# Patient Record
Sex: FEMALE | Race: BLACK OR AFRICAN AMERICAN | NOT HISPANIC OR LATINO | Employment: UNEMPLOYED | URBAN - METROPOLITAN AREA
[De-identification: names, ages, dates, MRNs, and addresses within clinical notes are randomized per-mention and may not be internally consistent; named-entity substitution may affect disease eponyms.]

---

## 2022-12-15 ENCOUNTER — HOSPITAL ENCOUNTER (OUTPATIENT)
Dept: DATA CONVERSION | Facility: HOSPITAL | Age: 22
End: 2022-12-15
Attending: OBSTETRICS & GYNECOLOGY

## 2022-12-15 DIAGNOSIS — R22.43 LOCALIZED SWELLING, MASS AND LUMP, LOWER LIMB, BILATERAL: ICD-10-CM

## 2022-12-15 DIAGNOSIS — Z97.5 PRESENCE OF (INTRAUTERINE) CONTRACEPTIVE DEVICE: ICD-10-CM

## 2022-12-15 DIAGNOSIS — Z87.891 PERSONAL HISTORY OF NICOTINE DEPENDENCE: ICD-10-CM

## 2022-12-15 DIAGNOSIS — O99.891 OTHER SPECIFIED DISEASES AND CONDITIONS COMPLICATING PREGNANCY (HHS-HCC): ICD-10-CM

## 2022-12-15 DIAGNOSIS — M79.89 OTHER SPECIFIED SOFT TISSUE DISORDERS: ICD-10-CM

## 2022-12-15 DIAGNOSIS — Z3A.37 37 WEEKS GESTATION OF PREGNANCY (HHS-HCC): ICD-10-CM

## 2022-12-15 DIAGNOSIS — N89.8 OTHER SPECIFIED NONINFLAMMATORY DISORDERS OF VAGINA: ICD-10-CM

## 2023-03-01 NOTE — PROGRESS NOTES
"    Current Stage:   Stage: Triage     Subjective Data:   Antepartum:  Antepartum:    21YO  PPD6 who presents for bilateral LE swelling/difficulty ambulating as well as pain at nexplanon site. Patient states that she has had some swelling in her legs, particularly  the R foot and ankle since leaving the hospital 2 days ago. She states that she no longer has any leg pain, just the swelling, and since they're unequal, she's worried that something is happening. She does endorse some vaginal discharge as well as suprapubic  pain, which she feels is normal for her post-partum. She denies any fevers, shortness of breath, chest pain, vomiting, diarrhea. She denies any changes in urine output, dysuria, or urinary retention. She does endorse a mild headache, which feels like  her migraines. additionally she is concerned that the nexplanon in her L arm is \"turning outward\" and she's worried that it's moving when it shouldn't.    ObHx:  : 2022    27.6wga    38wga  GynHx: remote hx of chlamydia and gonorrhea in 2017  PMHx: see above  SurgHx: tonsils  Meds: none  All: NKDA  FHx: paternal and maternal HTN  SocHx: no current t/e/i use        Objective Information:    Objective Information:      T   P  R  BP   MAP  SpO2   Value  36  75  18  138/64   92  95%  Date/Time 12/15 19:06 12/15 19:07 12/15 19:06 12/15 19:07  12/15 19:07 12/15 19:06  Range  (36C - 36C )  (75 - 81 )  (18 - 18 )  (138 - 138 )/ (64 - 64 )  (92 - 92 )  (95% - 95% )      Physical Exam:   Constitutional: alert, oriented, in no acute distress.   Obstetric: deferred   Eyes: wears glasses. pupils equal, sclerae clear   Respiratory/Thorax: normal respiratory effort, lungs  clear, no wheezes or rhonchi   Cardiovascular: normal S1 S2 RRR, no murmurs   Gastrointestinal: soft, nontender, nondistended without  guarding or rebound tenderness.   Musculoskeletal: normal gait   Extremities: 1+ edema of bilateral lower extremities  with mild " increase in R dorsum and ankle swelling. No calf or thigh tenderness, erythema or induration. 5/5 strength in dorsiflexion, plantar flexion, knee flexion bilaterally.   Neurological: no obvious facial droop, slurred speech,  moves all four extremities spontaneously. No evidence of focal deficit   Psychological: appropriate mood and affect   Skin: subcentimeter scab vs dermatofibroma on posteromedial  R LE.     Assessment and Plan:   Assessment:    23YO  PPD6 who presents for bilateral LE swelling.    #leg swelling  -low concern for DVT based on benign exam findings  -follow-up L LE Doppler    #nexplanon  -correct position by palpation, reassured    Dispo: signed out to night team.    Discussed with Dr. Rahul Adan MD  Emergency Medicine PGY-1      ADDENDUM: Doppler wet read neg for DVT. Stable for DC home with routine PP care.    French Camejo MD (Sally), PGY-4  Vocera      Attestation:   Note Completion:  I am a:  Resident/Fellow   Attending Attestation I saw and evaluated the patient.  I personally obtained the key and critical portions of the history and physical exam or was physically present for key and  critical portions performed by the resident/fellow. I reviewed the resident/fellow?s documentation and discussed the patient with the resident/fellow.  I agree with the resident/fellow?s medical decision making as documented in the note.     I personally evaluated the patient on 15-Dec-2022         Electronic Signatures:  Noe Adan (Resident))  (Signed 15-Dec-2022 19:54)   Authored: Current Stage, Subjective Data, Objective Data,  Assessment and Plan  Beatris Kay)  (Signed 16-Dec-2022 05:54)   Authored: Note Completion   Co-Signer: Assessment and Plan, Note Completion  French Camejo (Resident))  (Signed 15-Dec-2022 22:41)   Authored: Assessment and Plan, Note Completion      Last Updated: 16-Dec-2022 05:54 by Beatris Kay)

## 2023-09-08 VITALS — HEIGHT: 62 IN | BODY MASS INDEX: 53.92 KG/M2 | WEIGHT: 293 LBS

## 2023-09-14 NOTE — PROGRESS NOTES
"    Current Stage:   Stage: Triage     Subjective Data:   Antepartum:  Antepartum:    23YO  PPD6 who presents for bilateral LE swelling/difficulty ambulating as well as pain at nexplanon site. Patient states that she has had some swelling in her legs, particularly  the R foot and ankle since leaving the hospital 2 days ago. She states that she no longer has any leg pain, just the swelling, and since they're unequal, she's worried that something is happening. She does endorse some vaginal discharge as well as suprapubic  pain, which she feels is normal for her post-partum. She denies any fevers, shortness of breath, chest pain, vomiting, diarrhea. She denies any changes in urine output, dysuria, or urinary retention. She does endorse a mild headache, which feels like  her migraines. additionally she is concerned that the nexplanon in her L arm is \"turning outward\" and she's worried that it's moving when it shouldn't.    ObHx:  : 2022    27.6wga    38wga  GynHx: remote hx of chlamydia and gonorrhea in 2017  PMHx: see above  SurgHx: tonsils  Meds: none  All: NKDA  FHx: paternal and maternal HTN  SocHx: no current t/e/i use        Objective Information:    Objective Information:      T   P  R  BP   MAP  SpO2   Value  36  75  18  138/64   92  95%  Date/Time 12/15 19:06 12/15 19:07 12/15 19:06 12/15 19:07  12/15 19:07 12/15 19:06  Range  (36C - 36C )  (75 - 81 )  (18 - 18 )  (138 - 138 )/ (64 - 64 )  (92 - 92 )  (95% - 95% )      Physical Exam:   Constitutional: alert, oriented, in no acute distress.   Obstetric: deferred   Eyes: wears glasses. pupils equal, sclerae clear   Respiratory/Thorax: normal respiratory effort, lungs  clear, no wheezes or rhonchi   Cardiovascular: normal S1 S2 RRR, no murmurs   Gastrointestinal: soft, nontender, nondistended without  guarding or rebound tenderness.   Musculoskeletal: normal gait   Extremities: 1+ edema of bilateral lower extremities  with mild " increase in R dorsum and ankle swelling. No calf or thigh tenderness, erythema or induration. 5/5 strength in dorsiflexion, plantar flexion, knee flexion bilaterally.   Neurological: no obvious facial droop, slurred speech,  moves all four extremities spontaneously. No evidence of focal deficit   Psychological: appropriate mood and affect   Skin: subcentimeter scab vs dermatofibroma on posteromedial  R LE.     Assessment and Plan:   Assessment:    21YO  PPD6 who presents for bilateral LE swelling.    #leg swelling  -low concern for DVT based on benign exam findings  -follow-up L LE Doppler    #nexplanon  -correct position by palpation, reassured    Dispo: signed out to night team.    Discussed with Dr. Rahul Adan MD  Emergency Medicine PGY-1      ADDENDUM: Doppler wet read neg for DVT. Stable for DC home with routine PP care.    French Camejo MD (Sally), PGY-4  Vocera      Attestation:   Note Completion:  I am a:  Resident/Fellow   Attending Attestation I saw and evaluated the patient.  I personally obtained the key and critical portions of the history and physical exam or was physically present for key and  critical portions performed by the resident/fellow. I reviewed the resident/fellow?s documentation and discussed the patient with the resident/fellow.  I agree with the resident/fellow?s medical decision making as documented in the note.     I personally evaluated the patient on 15-Dec-2022         Electronic Signatures:  Noe Adan (Resident))  (Signed 15-Dec-2022 19:54)   Authored: Current Stage, Subjective Data, Objective Data,  Assessment and Plan  Beatris Kay)  (Signed 16-Dec-2022 05:54)   Authored: Note Completion   Co-Signer: Assessment and Plan, Note Completion  French Camejo (Resident))  (Signed 15-Dec-2022 22:41)   Authored: Assessment and Plan, Note Completion      Last Updated: 16-Dec-2022 05:54 by Beatris Kay)

## 2024-06-20 ENCOUNTER — APPOINTMENT (OUTPATIENT)
Dept: PRIMARY CARE | Facility: CLINIC | Age: 24
End: 2024-06-20
Payer: MEDICAID

## 2024-06-26 ENCOUNTER — HOSPITAL ENCOUNTER (EMERGENCY)
Facility: HOSPITAL | Age: 24
Discharge: HOME | End: 2024-06-26
Attending: EMERGENCY MEDICINE
Payer: MEDICAID

## 2024-06-26 VITALS
BODY MASS INDEX: 52.08 KG/M2 | HEIGHT: 62 IN | SYSTOLIC BLOOD PRESSURE: 139 MMHG | RESPIRATION RATE: 18 BRPM | TEMPERATURE: 97.4 F | HEART RATE: 100 BPM | OXYGEN SATURATION: 98 % | DIASTOLIC BLOOD PRESSURE: 78 MMHG | WEIGHT: 283 LBS

## 2024-06-26 DIAGNOSIS — R42 DIZZINESS: Primary | ICD-10-CM

## 2024-06-26 LAB
ALBUMIN SERPL BCP-MCNC: 3.6 G/DL (ref 3.4–5)
ALP SERPL-CCNC: 89 U/L (ref 33–110)
ALT SERPL W P-5'-P-CCNC: 15 U/L (ref 7–45)
AMPHETAMINES UR QL SCN: NORMAL
ANION GAP SERPL CALC-SCNC: 12 MMOL/L (ref 10–20)
APPEARANCE UR: CLEAR
AST SERPL W P-5'-P-CCNC: 15 U/L (ref 9–39)
BARBITURATES UR QL SCN: NORMAL
BASOPHILS # BLD AUTO: 0.02 X10*3/UL (ref 0–0.1)
BASOPHILS NFR BLD AUTO: 0.2 %
BENZODIAZ UR QL SCN: NORMAL
BILIRUB SERPL-MCNC: 0.6 MG/DL (ref 0–1.2)
BILIRUB UR STRIP.AUTO-MCNC: NEGATIVE MG/DL
BUN SERPL-MCNC: 13 MG/DL (ref 6–23)
BZE UR QL SCN: NORMAL
CALCIUM SERPL-MCNC: 9.5 MG/DL (ref 8.6–10.3)
CANNABINOIDS UR QL SCN: NORMAL
CHLORIDE SERPL-SCNC: 98 MMOL/L (ref 98–107)
CO2 SERPL-SCNC: 27 MMOL/L (ref 21–32)
COLOR UR: NORMAL
CREAT SERPL-MCNC: 0.66 MG/DL (ref 0.5–1.05)
EGFRCR SERPLBLD CKD-EPI 2021: >90 ML/MIN/1.73M*2
EOSINOPHIL # BLD AUTO: 0.38 X10*3/UL (ref 0–0.7)
EOSINOPHIL NFR BLD AUTO: 3 %
ERYTHROCYTE [DISTWIDTH] IN BLOOD BY AUTOMATED COUNT: 17.3 % (ref 11.5–14.5)
FENTANYL+NORFENTANYL UR QL SCN: NORMAL
GLUCOSE BLD MANUAL STRIP-MCNC: 89 MG/DL (ref 74–99)
GLUCOSE SERPL-MCNC: 95 MG/DL (ref 74–99)
GLUCOSE UR STRIP.AUTO-MCNC: NORMAL MG/DL
HCG UR QL IA.RAPID: NEGATIVE
HCT VFR BLD AUTO: 38.8 % (ref 36–46)
HGB BLD-MCNC: 11.3 G/DL (ref 12–16)
HOLD SPECIMEN: NORMAL
IMM GRANULOCYTES # BLD AUTO: 0.04 X10*3/UL (ref 0–0.7)
IMM GRANULOCYTES NFR BLD AUTO: 0.3 % (ref 0–0.9)
KETONES UR STRIP.AUTO-MCNC: NEGATIVE MG/DL
LEUKOCYTE ESTERASE UR QL STRIP.AUTO: NEGATIVE
LIPASE SERPL-CCNC: 13 U/L (ref 9–82)
LYMPHOCYTES # BLD AUTO: 3.62 X10*3/UL (ref 1.2–4.8)
LYMPHOCYTES NFR BLD AUTO: 28.2 %
MAGNESIUM SERPL-MCNC: 1.9 MG/DL (ref 1.6–2.4)
MCH RBC QN AUTO: 19.8 PG (ref 26–34)
MCHC RBC AUTO-ENTMCNC: 29.1 G/DL (ref 32–36)
MCV RBC AUTO: 68 FL (ref 80–100)
METHADONE UR QL SCN: NORMAL
MONOCYTES # BLD AUTO: 0.73 X10*3/UL (ref 0.1–1)
MONOCYTES NFR BLD AUTO: 5.7 %
NEUTROPHILS # BLD AUTO: 8.05 X10*3/UL (ref 1.2–7.7)
NEUTROPHILS NFR BLD AUTO: 62.6 %
NITRITE UR QL STRIP.AUTO: NEGATIVE
NRBC BLD-RTO: 0 /100 WBCS (ref 0–0)
OPIATES UR QL SCN: NORMAL
OXYCODONE+OXYMORPHONE UR QL SCN: NORMAL
PCP UR QL SCN: NORMAL
PH UR STRIP.AUTO: 5.5 [PH]
PLATELET # BLD AUTO: 405 X10*3/UL (ref 150–450)
POTASSIUM SERPL-SCNC: 3.4 MMOL/L (ref 3.5–5.3)
PROT SERPL-MCNC: 7.7 G/DL (ref 6.4–8.2)
PROT UR STRIP.AUTO-MCNC: NEGATIVE MG/DL
RBC # BLD AUTO: 5.7 X10*6/UL (ref 4–5.2)
RBC # UR STRIP.AUTO: NEGATIVE /UL
SODIUM SERPL-SCNC: 134 MMOL/L (ref 136–145)
SP GR UR STRIP.AUTO: 1.01
UROBILINOGEN UR STRIP.AUTO-MCNC: NORMAL MG/DL
WBC # BLD AUTO: 12.8 X10*3/UL (ref 4.4–11.3)

## 2024-06-26 PROCEDURE — 99283 EMERGENCY DEPT VISIT LOW MDM: CPT | Performed by: EMERGENCY MEDICINE

## 2024-06-26 PROCEDURE — 85025 COMPLETE CBC W/AUTO DIFF WBC: CPT | Performed by: SURGERY

## 2024-06-26 PROCEDURE — 83735 ASSAY OF MAGNESIUM: CPT | Performed by: SURGERY

## 2024-06-26 PROCEDURE — 82947 ASSAY GLUCOSE BLOOD QUANT: CPT

## 2024-06-26 PROCEDURE — 81025 URINE PREGNANCY TEST: CPT | Performed by: SURGERY

## 2024-06-26 PROCEDURE — 81003 URINALYSIS AUTO W/O SCOPE: CPT | Performed by: SURGERY

## 2024-06-26 PROCEDURE — 80307 DRUG TEST PRSMV CHEM ANLYZR: CPT | Performed by: SURGERY

## 2024-06-26 PROCEDURE — 83690 ASSAY OF LIPASE: CPT | Performed by: SURGERY

## 2024-06-26 PROCEDURE — 80053 COMPREHEN METABOLIC PANEL: CPT | Performed by: SURGERY

## 2024-06-26 PROCEDURE — 36415 COLL VENOUS BLD VENIPUNCTURE: CPT | Performed by: SURGERY

## 2024-06-26 RX ORDER — ONDANSETRON HYDROCHLORIDE 2 MG/ML
4 INJECTION, SOLUTION INTRAVENOUS ONCE
Status: DISCONTINUED | OUTPATIENT
Start: 2024-06-26 | End: 2024-06-26 | Stop reason: HOSPADM

## 2024-06-26 ASSESSMENT — COLUMBIA-SUICIDE SEVERITY RATING SCALE - C-SSRS
1. IN THE PAST MONTH, HAVE YOU WISHED YOU WERE DEAD OR WISHED YOU COULD GO TO SLEEP AND NOT WAKE UP?: NO
6. HAVE YOU EVER DONE ANYTHING, STARTED TO DO ANYTHING, OR PREPARED TO DO ANYTHING TO END YOUR LIFE?: NO
2. HAVE YOU ACTUALLY HAD ANY THOUGHTS OF KILLING YOURSELF?: NO

## 2024-06-26 NOTE — ED TRIAGE NOTES
Patient arrived to ed with complaint of dizziness and nausea. The patient stated it had been going on for 40 minutes. No prior medical history. Gcs 15.

## 2024-06-26 NOTE — Clinical Note
Surendra Street was seen and treated in our emergency department on 6/26/2024.  She may return to work on 06/27/2024.       If you have any questions or concerns, please don't hesitate to call.      Sandie Linares, DO

## 2024-06-26 NOTE — ED PROVIDER NOTES
Chief Complaint   Patient presents with    Dizziness     HPI:   Surendra Street is an 24 y.o. obese female presenting to the ED for chief complaint of dizziness and nausea that started about 40 minutes prior to arrival.  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  She explains that she feels lightheaded that is worse when she changes positions.  She explains she does not have any symptoms when she is staying in 1 position.  She endorses associated nausea with no vomiting.  Denies fever chills or sweats.  Denies abdominal pain.  Her last bowel movement was yesterday and was normal.  Denies chest pain or shortness of breath.    Medications: Nexplanon  Soc HX: Denies substances  No Known Allergies:  Past Medical History:   Diagnosis Date    Anxiety disorder, unspecified     Anxiety and depression    Candidiasis of skin and nail 04/15/2015    Candidal dermatitis    Encounter for immunization 2015    Immunization due    Encounter for routine checking of intrauterine contraceptive device 2019    Intrauterine device surveillance    Encounter for screening for disorder due to exposure to contaminants     Screening for lead exposure    Encounter for supervision of normal first pregnancy, unspecified trimester ([facility])     Pregnancy, first    Personal history of other (healed) physical injury and trauma     History of sprain of ankle    Personal history of other infectious and parasitic diseases 2017    History of gonorrhea    Personal history of pre-term labor     History of  delivery     Past Surgical History:   Procedure Laterality Date    OTHER SURGICAL HISTORY  2018    Tonsillectomy with adenoidectomy    OTHER SURGICAL HISTORY  2018    Tooth extraction     No family history on file.     Physical Exam  Vitals and nursing note reviewed.   Constitutional:       General: She is not in acute distress.     Appearance: She is  well-developed. She is not ill-appearing.   HENT:      Head: Normocephalic and atraumatic.      Right Ear: Tympanic membrane normal.      Left Ear: Tympanic membrane normal.      Nose: Nose normal.      Mouth/Throat:      Mouth: Mucous membranes are moist.      Pharynx: Oropharynx is clear.   Eyes:      Extraocular Movements: Extraocular movements intact.      Conjunctiva/sclera: Conjunctivae normal.      Pupils: Pupils are equal, round, and reactive to light.   Cardiovascular:      Rate and Rhythm: Normal rate and regular rhythm.      Heart sounds: Normal heart sounds. No murmur heard.  Pulmonary:      Effort: Pulmonary effort is normal. No respiratory distress.      Breath sounds: Normal breath sounds.   Abdominal:      Palpations: Abdomen is soft.      Tenderness: There is no abdominal tenderness. There is no guarding or rebound.   Musculoskeletal:         General: No swelling.      Cervical back: Neck supple.   Skin:     General: Skin is warm and dry.      Capillary Refill: Capillary refill takes less than 2 seconds.   Neurological:      General: No focal deficit present.      Mental Status: She is alert and oriented to person, place, and time.   Psychiatric:         Mood and Affect: Mood normal.        VS: As documented in the triage note and EMR flowsheet from this visit were reviewed.    Medical Decision Making: This is a 24-year-old female presenting to the ED with chief complaint of dizziness and nausea that started while she was working about 40 minutes prior to arrival.  Initial exam patient's vitals are stable she is speaking in full sentences slowly but articulately in no acute distress.  PERRLA, EOMI. TMs are clear bilaterally.  Posterior pharynx is clear.  Heart rate and rhythm is regular.  Lungs clear to auscultation bilaterally.  Abdomen was soft without peritoneal signs.  Basic lab work was ordered as well as urinalysis and drug screen.  Patient was signed out to incoming doctor Dr. Linares pending  lab work results and orthostatic vitals.  Considered CT imaging of the head however patient did not have a headache and her dizziness was positional.     This patient was cared for in the setting of nationwide stress on resources and staffing.    This report was transcribed using voice recognition software.  Every effort was made to ensure accuracy, however, inadvertently computerized transcription errors may be present.         Daryn Ross PA-C  06/26/24 0603

## 2024-06-27 ENCOUNTER — APPOINTMENT (OUTPATIENT)
Dept: OBSTETRICS AND GYNECOLOGY | Facility: CLINIC | Age: 24
End: 2024-06-27
Payer: MEDICAID

## 2024-12-12 ENCOUNTER — HOSPITAL ENCOUNTER (EMERGENCY)
Facility: HOSPITAL | Age: 24
Discharge: HOME | End: 2024-12-12
Payer: MEDICAID

## 2024-12-12 VITALS
OXYGEN SATURATION: 100 % | RESPIRATION RATE: 18 BRPM | TEMPERATURE: 97.3 F | WEIGHT: 283 LBS | HEIGHT: 62 IN | SYSTOLIC BLOOD PRESSURE: 133 MMHG | HEART RATE: 71 BPM | BODY MASS INDEX: 52.08 KG/M2 | DIASTOLIC BLOOD PRESSURE: 65 MMHG

## 2024-12-12 DIAGNOSIS — Z11.3 SCREEN FOR STD (SEXUALLY TRANSMITTED DISEASE): Primary | ICD-10-CM

## 2024-12-12 LAB
APPEARANCE UR: ABNORMAL
BILIRUB UR STRIP.AUTO-MCNC: NEGATIVE MG/DL
C TRACH RRNA SPEC QL NAA+PROBE: NEGATIVE
CLUE CELLS SPEC QL WET PREP: NORMAL
COLOR UR: YELLOW
GLUCOSE UR STRIP.AUTO-MCNC: NORMAL MG/DL
HCV AB SER QL: NONREACTIVE
HIV 1+2 AB+HIV1 P24 AG SERPL QL IA: NONREACTIVE
HOLD SPECIMEN: NORMAL
KETONES UR STRIP.AUTO-MCNC: ABNORMAL MG/DL
LEUKOCYTE ESTERASE UR QL STRIP.AUTO: ABNORMAL
MUCOUS THREADS #/AREA URNS AUTO: ABNORMAL /LPF
N GONORRHOEA DNA SPEC QL PROBE+SIG AMP: NEGATIVE
NITRITE UR QL STRIP.AUTO: NEGATIVE
PH UR STRIP.AUTO: 7 [PH]
PREGNANCY TEST URINE, POC: NEGATIVE
PROT UR STRIP.AUTO-MCNC: NEGATIVE MG/DL
RBC # UR STRIP.AUTO: NEGATIVE /UL
RBC #/AREA URNS AUTO: ABNORMAL /HPF
SP GR UR STRIP.AUTO: 1.02
SQUAMOUS #/AREA URNS AUTO: ABNORMAL /HPF
T VAGINALIS RRNA SPEC QL NAA+PROBE: NEGATIVE
T VAGINALIS SPEC QL WET PREP: NORMAL
TREPONEMA PALLIDUM IGG+IGM AB [PRESENCE] IN SERUM OR PLASMA BY IMMUNOASSAY: NONREACTIVE
TRICHOMONAS REFLEX COMMENT: NORMAL
UROBILINOGEN UR STRIP.AUTO-MCNC: ABNORMAL MG/DL
WBC #/AREA URNS AUTO: ABNORMAL /HPF
WBC VAG QL WET PREP: NORMAL
YEAST VAG QL WET PREP: NORMAL

## 2024-12-12 PROCEDURE — 36415 COLL VENOUS BLD VENIPUNCTURE: CPT | Performed by: PHYSICIAN ASSISTANT

## 2024-12-12 PROCEDURE — 87661 TRICHOMONAS VAGINALIS AMPLIF: CPT | Performed by: PHYSICIAN ASSISTANT

## 2024-12-12 PROCEDURE — 87491 CHLMYD TRACH DNA AMP PROBE: CPT | Performed by: PHYSICIAN ASSISTANT

## 2024-12-12 PROCEDURE — 99284 EMERGENCY DEPT VISIT MOD MDM: CPT

## 2024-12-12 PROCEDURE — 81025 URINE PREGNANCY TEST: CPT | Performed by: PHYSICIAN ASSISTANT

## 2024-12-12 PROCEDURE — 86803 HEPATITIS C AB TEST: CPT | Performed by: PHYSICIAN ASSISTANT

## 2024-12-12 PROCEDURE — 99284 EMERGENCY DEPT VISIT MOD MDM: CPT | Performed by: PHYSICIAN ASSISTANT

## 2024-12-12 PROCEDURE — 87591 N.GONORRHOEAE DNA AMP PROB: CPT | Performed by: PHYSICIAN ASSISTANT

## 2024-12-12 PROCEDURE — 86780 TREPONEMA PALLIDUM: CPT | Performed by: PHYSICIAN ASSISTANT

## 2024-12-12 PROCEDURE — 87086 URINE CULTURE/COLONY COUNT: CPT | Performed by: PHYSICIAN ASSISTANT

## 2024-12-12 PROCEDURE — 87210 SMEAR WET MOUNT SALINE/INK: CPT | Mod: 59 | Performed by: PHYSICIAN ASSISTANT

## 2024-12-12 PROCEDURE — 87389 HIV-1 AG W/HIV-1&-2 AB AG IA: CPT | Performed by: PHYSICIAN ASSISTANT

## 2024-12-12 PROCEDURE — 81001 URINALYSIS AUTO W/SCOPE: CPT | Performed by: PHYSICIAN ASSISTANT

## 2024-12-12 PROCEDURE — 99283 EMERGENCY DEPT VISIT LOW MDM: CPT

## 2024-12-12 RX ORDER — VALACYCLOVIR HYDROCHLORIDE 1 G/1
1000 TABLET, FILM COATED ORAL DAILY
Qty: 5 TABLET | Refills: 0 | Status: SHIPPED | OUTPATIENT
Start: 2024-12-12 | End: 2024-12-17

## 2024-12-12 ASSESSMENT — PAIN DESCRIPTION - LOCATION: LOCATION: VAGINA

## 2024-12-12 ASSESSMENT — COLUMBIA-SUICIDE SEVERITY RATING SCALE - C-SSRS
6. HAVE YOU EVER DONE ANYTHING, STARTED TO DO ANYTHING, OR PREPARED TO DO ANYTHING TO END YOUR LIFE?: NO
2. HAVE YOU ACTUALLY HAD ANY THOUGHTS OF KILLING YOURSELF?: NO
1. IN THE PAST MONTH, HAVE YOU WISHED YOU WERE DEAD OR WISHED YOU COULD GO TO SLEEP AND NOT WAKE UP?: NO

## 2024-12-12 ASSESSMENT — PAIN DESCRIPTION - PAIN TYPE: TYPE: ACUTE PAIN

## 2024-12-12 ASSESSMENT — PAIN SCALES - GENERAL: PAINLEVEL_OUTOF10: 9

## 2024-12-12 ASSESSMENT — PAIN DESCRIPTION - DESCRIPTORS: DESCRIPTORS: BURNING

## 2024-12-12 ASSESSMENT — PAIN - FUNCTIONAL ASSESSMENT: PAIN_FUNCTIONAL_ASSESSMENT: 0-10

## 2024-12-12 NOTE — Clinical Note
Surendra Street was seen and treated in our emergency department on 12/12/2024.  She may return to work on 12/13/2024.       If you have any questions or concerns, please don't hesitate to call.      Cecilia Franco PA-C

## 2024-12-12 NOTE — ED PROVIDER NOTES
"This is a 24-year-old female with past medical history of genital herpes who presents to the ED with vaginal discharge, discomfort, and odor for the past 2 to 3 days that has been worsening.  She also endorses some discomfort around her labia with concern for a HSV outbreak.  She denies seeing any sores.  She states that she has recently been sexually active with 2 partners and does not use protection.  She would like to be screened for STDs.  She denies concern for pregnancy and has a Nexplanon device.      History provided by:  Patient   used: No             Visit Vitals  /65   Pulse 71   Temp 36.3 °C (97.3 °F) (Temporal)   Resp 18   Ht 1.575 m (5' 2\")   Wt 128 kg (283 lb)   SpO2 100%   BMI 51.76 kg/m²   BSA 2.37 m²          Physical Exam     Physical exam:    General: Vitals noted, no distress. Afebrile.    EENT: PERRL, EOM's intact. Eye lids without lesions. No scleral icterus. Normal phonation. Nares patent. MMM.     Cardiac: Regular, rate, rhythm, no murmur.    Pulmonary: Lungs clear bilaterally with good aeration. No adventitious breath sounds.    Abdomen: Nontender, soft, nonsurgical. No peritoneal signs. Normoactive bowel sounds. No CVA tenderness.     Pelvic Exam: Chaperone present. External genitalia normal. No rashes or lesions. Speculum exam shows no lesions on the cervix.  Moderate amount of white/gray discharge within the vagina. +odor. No bleeding. Closed cervical os. Bimanual exam shows no adnexal pain or mass. No cervical motion tenderness.    Extremities: No peripheral edema. Full range of motion. Moves all extremities freely.     Skin: No rash. Warm and dry. No discoloration noted.     Neuro: No focal neurologic deficits noted.  Pt is A&O x3, speech is clear, moves all extremities independently sensation is intact.            Labs Reviewed   POCT PREGNANCY, URINE - Normal       Result Value    Preg Test, Ur Negative     TRICHOMONAS WET PREP REFLEX TO PCR   C. TRACHOMATIS " + N. GONORRHOEAE, AMPLIFIED   URINALYSIS WITH REFLEX CULTURE AND MICROSCOPIC    Narrative:     The following orders were created for panel order Urinalysis with Reflex Culture and Microscopic.  Procedure                               Abnormality         Status                     ---------                               -----------         ------                     Urinalysis with Reflex C...[[id number]]                                                 Extra Urine Gray Tube[[id number]]                                                         Please view results for these tests on the individual orders.   SYPHILIS SCREENING WITH REFLEX   HIV 1/2 ANTIGEN/ANTIBODY SCREEN WIH REFLEX TO CONFIRMATION   HEPATITIS C ANTIBODY   URINALYSIS WITH REFLEX CULTURE AND MICROSCOPIC   EXTRA URINE GRAY TUBE       No orders to display           ED Course & MDM     Medical Decision Making  This is a 24-year-old female who presents to the ED with vaginal discharge and odor as well as concern for an impending HSV outbreak due to genital discomfort.  Vital stable upon arrival to the ED.  On examination abdomen soft and nontender.  No CVA tenderness.  Pelvic examination performed with chaperone present which showed a moderate amount of white discharge within the vagina.  There was an odor present.  No noticeable HSV lesions at this time.  No cervical motion tenderness.  No adnexal pain or masses palpated.  Wet prep as well as GC and chlamydia swabs obtained during pelvic examination.  Based on patient's pelvic examination low suspicion for PID/TOA at this time.  Urine pregnancy test obtained was negative.  UA ordered as well as HIV, hepatitis C, and syphilis screening.  Wet prep was negative.  I informed the patient of this result.  She did not want to wait for any other results at this point in time.  She was told that she would receive a phone call in 2-3 business days with any positive results on her STD screening.  She was written a  prescription for Valtrex due to concern for possible impending HSV outbreak.  She was given signs and symptoms that she should return to the ED with.  She was provided with OB/GYN follow-up information and was discharged from the ED in stable condition.    Amount and/or Complexity of Data Reviewed  Labs: ordered.    Risk  Prescription drug management.         Diagnoses as of 12/12/24 1222   Screen for STD (sexually transmitted disease)       Patient was seen independently    Procedures    ZARI Garcia, PA-C     Cecilia Franco PA-C  12/12/24 122

## 2024-12-13 LAB — BACTERIA UR CULT: NORMAL

## 2024-12-27 ENCOUNTER — OFFICE VISIT (OUTPATIENT)
Dept: OBSTETRICS AND GYNECOLOGY | Facility: CLINIC | Age: 24
End: 2024-12-27
Payer: MEDICAID

## 2024-12-27 VITALS
SYSTOLIC BLOOD PRESSURE: 134 MMHG | WEIGHT: 293 LBS | DIASTOLIC BLOOD PRESSURE: 83 MMHG | BODY MASS INDEX: 53.97 KG/M2 | HEART RATE: 97 BPM

## 2024-12-27 DIAGNOSIS — M62.89 PELVIC FLOOR DYSFUNCTION: ICD-10-CM

## 2024-12-27 DIAGNOSIS — R51.9 NONINTRACTABLE HEADACHE, UNSPECIFIED CHRONICITY PATTERN, UNSPECIFIED HEADACHE TYPE: ICD-10-CM

## 2024-12-27 DIAGNOSIS — Z11.3 SCREEN FOR STD (SEXUALLY TRANSMITTED DISEASE): Primary | ICD-10-CM

## 2024-12-27 DIAGNOSIS — N89.8 VAGINAL ODOR: ICD-10-CM

## 2024-12-27 PROCEDURE — 87205 SMEAR GRAM STAIN: CPT | Performed by: STUDENT IN AN ORGANIZED HEALTH CARE EDUCATION/TRAINING PROGRAM

## 2024-12-27 ASSESSMENT — PATIENT HEALTH QUESTIONNAIRE - PHQ9
1. LITTLE INTEREST OR PLEASURE IN DOING THINGS: MORE THAN HALF THE DAYS
10. IF YOU CHECKED OFF ANY PROBLEMS, HOW DIFFICULT HAVE THESE PROBLEMS MADE IT FOR YOU TO DO YOUR WORK, TAKE CARE OF THINGS AT HOME, OR GET ALONG WITH OTHER PEOPLE: SOMEWHAT DIFFICULT
SUM OF ALL RESPONSES TO PHQ9 QUESTIONS 1 AND 2: 2
2. FEELING DOWN, DEPRESSED OR HOPELESS: NOT AT ALL

## 2024-12-27 NOTE — PROGRESS NOTES
Surendra Street is a 24 y.o. female who is here for vaginal odor    Subjective   Vaginal odor since completing HSV txt. No abn discharge. Notes some vaginal pain with intercourse    HA for last few months. Needs to see optometrist. No other significant sx related to this    Objective   /83 (BP Location: Right arm, Patient Position: Sitting, BP Cuff Size: Adult)   Pulse 97   Wt 134 kg (295 lb 1.6 oz)   BMI 53.97 kg/m²     Physical Exam  Constitutional:       Appearance: Normal appearance.   HENT:      Head: Normocephalic and atraumatic.      Nose: Nose normal.      Mouth/Throat:      Mouth: Mucous membranes are moist.   Eyes:      Extraocular Movements: Extraocular movements intact.   Pulmonary:      Effort: Pulmonary effort is normal.   Abdominal:      General: Abdomen is flat.   Genitourinary:     Comments: Normal external genitalia, vagina, cervix. No significant discharge  Mild pelvic floor tenderness on bimanual exam  Musculoskeletal:         General: Normal range of motion.   Skin:     General: Skin is warm and dry.   Neurological:      General: No focal deficit present.      Mental Status: She is alert and oriented to person, place, and time.   Psychiatric:         Mood and Affect: Mood normal.         Behavior: Behavior normal.       Assessment/Plan   Surendra Street is a 24 y.o. female who is here for vaginal odor  Problem List Items Addressed This Visit       Vaginal odor    Current Assessment & Plan     Vaginitis swab sent will follow up results. No notable exam findings         Relevant Orders    Vaginitis Gram Stain For Bacterial Vaginosis + Yeast    Pelvic floor dysfunction    Current Assessment & Plan     PFPT referral         Relevant Orders    Referral to Physical Therapy    Nonintractable headache    Current Assessment & Plan     Referral to primary care. Rec follow up with optometrist. No red flag sx         Relevant Orders    Referral to Primary Care     Other Visit Diagnoses        Screen for STD (sexually transmitted disease)    -  Primary           FU 3 months for annual    D/w Dr. Curtis Roa MD  PGY-4, Obstetrics and Gynecology

## 2024-12-28 LAB
CLUE CELLS VAG LPF-#/AREA: NORMAL /[LPF]
NUGENT SCORE: 1
YEAST VAG WET PREP-#/AREA: NORMAL

## 2025-01-01 ENCOUNTER — HOSPITAL ENCOUNTER (EMERGENCY)
Facility: HOSPITAL | Age: 25
Discharge: HOME | End: 2025-01-01
Attending: INTERNAL MEDICINE
Payer: MEDICAID

## 2025-01-01 ENCOUNTER — APPOINTMENT (OUTPATIENT)
Dept: RADIOLOGY | Facility: HOSPITAL | Age: 25
End: 2025-01-01
Payer: MEDICAID

## 2025-01-01 ENCOUNTER — HOSPITAL ENCOUNTER (OUTPATIENT)
Facility: HOSPITAL | Age: 25
Discharge: STILL ADMITTED OR ADMINISTRATIVE | End: 2025-01-01
Attending: OBSTETRICS & GYNECOLOGY | Admitting: OBSTETRICS & GYNECOLOGY
Payer: MEDICAID

## 2025-01-01 VITALS
WEIGHT: 293 LBS | SYSTOLIC BLOOD PRESSURE: 132 MMHG | HEART RATE: 80 BPM | RESPIRATION RATE: 20 BRPM | HEIGHT: 62 IN | DIASTOLIC BLOOD PRESSURE: 82 MMHG | TEMPERATURE: 98.3 F | BODY MASS INDEX: 53.92 KG/M2 | OXYGEN SATURATION: 99 %

## 2025-01-01 DIAGNOSIS — N39.0 URINARY TRACT INFECTION WITHOUT HEMATURIA, SITE UNSPECIFIED: Primary | ICD-10-CM

## 2025-01-01 LAB
ALBUMIN SERPL BCP-MCNC: 3.7 G/DL (ref 3.4–5)
ALP SERPL-CCNC: 91 U/L (ref 33–110)
ALT SERPL W P-5'-P-CCNC: 19 U/L (ref 7–45)
ANION GAP SERPL CALC-SCNC: 10 MMOL/L (ref 10–20)
APPEARANCE UR: ABNORMAL
AST SERPL W P-5'-P-CCNC: 18 U/L (ref 9–39)
BACTERIA #/AREA URNS AUTO: ABNORMAL /HPF
BASOPHILS # BLD AUTO: 0.03 X10*3/UL (ref 0–0.1)
BASOPHILS NFR BLD AUTO: 0.2 %
BILIRUB SERPL-MCNC: 0.5 MG/DL (ref 0–1.2)
BILIRUB UR STRIP.AUTO-MCNC: NEGATIVE MG/DL
BUN SERPL-MCNC: 13 MG/DL (ref 6–23)
CALCIUM SERPL-MCNC: 9.5 MG/DL (ref 8.6–10.3)
CHLORIDE SERPL-SCNC: 105 MMOL/L (ref 98–107)
CO2 SERPL-SCNC: 27 MMOL/L (ref 21–32)
COLOR UR: YELLOW
CREAT SERPL-MCNC: 0.76 MG/DL (ref 0.5–1.05)
EGFRCR SERPLBLD CKD-EPI 2021: >90 ML/MIN/1.73M*2
EOSINOPHIL # BLD AUTO: 0.05 X10*3/UL (ref 0–0.7)
EOSINOPHIL NFR BLD AUTO: 0.3 %
ERYTHROCYTE [DISTWIDTH] IN BLOOD BY AUTOMATED COUNT: 16.2 % (ref 11.5–14.5)
FLUAV RNA RESP QL NAA+PROBE: NOT DETECTED
FLUBV RNA RESP QL NAA+PROBE: NOT DETECTED
GLUCOSE SERPL-MCNC: 103 MG/DL (ref 74–99)
GLUCOSE UR STRIP.AUTO-MCNC: NORMAL MG/DL
HCG UR QL IA.RAPID: NEGATIVE
HCT VFR BLD AUTO: 40.7 % (ref 36–46)
HGB BLD-MCNC: 11.9 G/DL (ref 12–16)
IMM GRANULOCYTES # BLD AUTO: 0.03 X10*3/UL (ref 0–0.7)
IMM GRANULOCYTES NFR BLD AUTO: 0.2 % (ref 0–0.9)
KETONES UR STRIP.AUTO-MCNC: ABNORMAL MG/DL
LACTATE SERPL-SCNC: 1.2 MMOL/L (ref 0.4–2)
LEUKOCYTE ESTERASE UR QL STRIP.AUTO: ABNORMAL
LIPASE SERPL-CCNC: 10 U/L (ref 9–82)
LYMPHOCYTES # BLD AUTO: 1.71 X10*3/UL (ref 1.2–4.8)
LYMPHOCYTES NFR BLD AUTO: 11.8 %
MAGNESIUM SERPL-MCNC: 1.94 MG/DL (ref 1.6–2.4)
MCH RBC QN AUTO: 20.4 PG (ref 26–34)
MCHC RBC AUTO-ENTMCNC: 29.2 G/DL (ref 32–36)
MCV RBC AUTO: 70 FL (ref 80–100)
MONOCYTES # BLD AUTO: 0.39 X10*3/UL (ref 0.1–1)
MONOCYTES NFR BLD AUTO: 2.7 %
MUCOUS THREADS #/AREA URNS AUTO: ABNORMAL /LPF
NEUTROPHILS # BLD AUTO: 12.3 X10*3/UL (ref 1.2–7.7)
NEUTROPHILS NFR BLD AUTO: 84.8 %
NITRITE UR QL STRIP.AUTO: NEGATIVE
NRBC BLD-RTO: 0 /100 WBCS (ref 0–0)
PH UR STRIP.AUTO: 5.5 [PH]
PLATELET # BLD AUTO: 403 X10*3/UL (ref 150–450)
POTASSIUM SERPL-SCNC: 4.3 MMOL/L (ref 3.5–5.3)
PROT SERPL-MCNC: 8.4 G/DL (ref 6.4–8.2)
PROT UR STRIP.AUTO-MCNC: ABNORMAL MG/DL
RBC # BLD AUTO: 5.84 X10*6/UL (ref 4–5.2)
RBC # UR STRIP.AUTO: NEGATIVE /UL
RBC #/AREA URNS AUTO: ABNORMAL /HPF
SARS-COV-2 RNA RESP QL NAA+PROBE: NOT DETECTED
SODIUM SERPL-SCNC: 138 MMOL/L (ref 136–145)
SP GR UR STRIP.AUTO: 1.03
SQUAMOUS #/AREA URNS AUTO: ABNORMAL /HPF
UROBILINOGEN UR STRIP.AUTO-MCNC: ABNORMAL MG/DL
WBC # BLD AUTO: 14.5 X10*3/UL (ref 4.4–11.3)
WBC #/AREA URNS AUTO: ABNORMAL /HPF

## 2025-01-01 PROCEDURE — 83690 ASSAY OF LIPASE: CPT | Performed by: INTERNAL MEDICINE

## 2025-01-01 PROCEDURE — 87086 URINE CULTURE/COLONY COUNT: CPT | Mod: AHULAB | Performed by: INTERNAL MEDICINE

## 2025-01-01 PROCEDURE — 2550000001 HC RX 255 CONTRASTS: Performed by: INTERNAL MEDICINE

## 2025-01-01 PROCEDURE — 85025 COMPLETE CBC W/AUTO DIFF WBC: CPT | Performed by: INTERNAL MEDICINE

## 2025-01-01 PROCEDURE — 96374 THER/PROPH/DIAG INJ IV PUSH: CPT | Mod: 59

## 2025-01-01 PROCEDURE — 81001 URINALYSIS AUTO W/SCOPE: CPT | Performed by: INTERNAL MEDICINE

## 2025-01-01 PROCEDURE — 36415 COLL VENOUS BLD VENIPUNCTURE: CPT | Performed by: INTERNAL MEDICINE

## 2025-01-01 PROCEDURE — 81025 URINE PREGNANCY TEST: CPT | Performed by: INTERNAL MEDICINE

## 2025-01-01 PROCEDURE — 87636 SARSCOV2 & INF A&B AMP PRB: CPT | Performed by: INTERNAL MEDICINE

## 2025-01-01 PROCEDURE — 2500000004 HC RX 250 GENERAL PHARMACY W/ HCPCS (ALT 636 FOR OP/ED): Performed by: INTERNAL MEDICINE

## 2025-01-01 PROCEDURE — 99285 EMERGENCY DEPT VISIT HI MDM: CPT | Mod: 25 | Performed by: INTERNAL MEDICINE

## 2025-01-01 PROCEDURE — 83605 ASSAY OF LACTIC ACID: CPT | Performed by: INTERNAL MEDICINE

## 2025-01-01 PROCEDURE — 74177 CT ABD & PELVIS W/CONTRAST: CPT | Performed by: RADIOLOGY

## 2025-01-01 PROCEDURE — 80053 COMPREHEN METABOLIC PANEL: CPT | Performed by: INTERNAL MEDICINE

## 2025-01-01 PROCEDURE — 83735 ASSAY OF MAGNESIUM: CPT | Performed by: INTERNAL MEDICINE

## 2025-01-01 PROCEDURE — 74177 CT ABD & PELVIS W/CONTRAST: CPT

## 2025-01-01 PROCEDURE — 2500000001 HC RX 250 WO HCPCS SELF ADMINISTERED DRUGS (ALT 637 FOR MEDICARE OP): Performed by: INTERNAL MEDICINE

## 2025-01-01 RX ORDER — CEPHALEXIN 500 MG/1
500 CAPSULE ORAL ONCE
Status: COMPLETED | OUTPATIENT
Start: 2025-01-01 | End: 2025-01-01

## 2025-01-01 RX ORDER — ONDANSETRON 4 MG/1
4 TABLET, ORALLY DISINTEGRATING ORAL EVERY 8 HOURS PRN
Qty: 15 TABLET | Refills: 0 | Status: SHIPPED | OUTPATIENT
Start: 2025-01-01

## 2025-01-01 RX ORDER — CEPHALEXIN 500 MG/1
500 CAPSULE ORAL 2 TIMES DAILY
Qty: 10 CAPSULE | Refills: 0 | Status: SHIPPED | OUTPATIENT
Start: 2025-01-01 | End: 2025-01-08

## 2025-01-01 RX ORDER — ONDANSETRON HYDROCHLORIDE 2 MG/ML
4 INJECTION, SOLUTION INTRAVENOUS ONCE
Status: COMPLETED | OUTPATIENT
Start: 2025-01-01 | End: 2025-01-01

## 2025-01-01 RX ADMIN — ONDANSETRON 4 MG: 2 INJECTION, SOLUTION INTRAMUSCULAR; INTRAVENOUS at 17:25

## 2025-01-01 RX ADMIN — IOHEXOL 75 ML: 350 INJECTION, SOLUTION INTRAVENOUS at 20:23

## 2025-01-01 RX ADMIN — CEPHALEXIN 500 MG: 500 CAPSULE ORAL at 22:15

## 2025-01-01 ASSESSMENT — PAIN DESCRIPTION - PAIN TYPE: TYPE: ACUTE PAIN

## 2025-01-01 ASSESSMENT — PAIN DESCRIPTION - ORIENTATION: ORIENTATION: LOWER

## 2025-01-01 ASSESSMENT — PAIN DESCRIPTION - LOCATION: LOCATION: ABDOMEN

## 2025-01-01 ASSESSMENT — PAIN - FUNCTIONAL ASSESSMENT: PAIN_FUNCTIONAL_ASSESSMENT: 0-10

## 2025-01-01 ASSESSMENT — PAIN SCALES - GENERAL
PAINLEVEL_OUTOF10: 0 - NO PAIN
PAINLEVEL_OUTOF10: 9

## 2025-01-01 NOTE — ED PROVIDER NOTES
HPI     CC: Abdominal Pain     HPI: Surendra Street is a 24 y.o. female smoker with no significant past medical history presents with lower abdominal pain and vomiting.  Patient states that symptoms started a few hours ago with generalized pain across her lower abdomen and 2 episodes of vomiting.  She denies diarrhea, fever, chills, dysuria, hematuria, vaginal bleeding or discharge.  She has not eaten anything since this morning.  No sick contacts.  She has not taken any medications for her symptoms.  Of note she was seen in the ED on 12/12 with vaginal discharge and odor, had a negative workup including STI testing. Of note patient initially came via EMS and was brought to OB triage because they stated that patient was 33 weeks pregnant.  Patient denies chance of pregnancy and she was sent back down here to the ED for evaluation.    ROS: 10-point review of systems was performed and is otherwise negative except as noted in HPI.    Limitations to history: N/A    Independent Historians: N/A     External Records Reviewed: Outpatient notes in EMR    Past Medical History: Noncontributory except per HPI     Past Surgical History: Noncontributory except per HPI     Family History: Reviewed and noncontributory     Social History: Smokes tobacco. Denies ETOH. Denies illicit drugs.    Social Determinants Affecting Care: N/A    No Known Allergies    Home Meds:   Current Outpatient Medications   Medication Instructions    ACYCLOVIR ORAL Take by mouth.    cephalexin (KEFLEX) 500 mg, oral, 2 times daily    ondansetron ODT (ZOFRAN-ODT) 4 mg, oral, Every 8 hours PRN        Physical Exam     ED Triage Vitals [01/01/25 1548]   Temperature Heart Rate Respirations BP   36.8 °C (98.3 °F) (!) 101 14 (!) 126/96      Pulse Ox Temp Source Heart Rate Source Patient Position   95 % Oral Monitor --      BP Location FiO2 (%)     -- --         Heart Rate:  []   Temperature:  [36.8 °C (98.3 °F)]   Respirations:  [14-20]   BP:  "(122-139)/(66-96)   Height:  [157.5 cm (5' 2\")]   Weight:  [134 kg (295 lb 6.7 oz)]   Pulse Ox:  [95 %-100 %]      Physical Exam  Vitals and nursing note reviewed.     CONSTITUTIONAL: Well appearing, well nourished, in no acute distress.   HENMT: Head atraumatic. Airway patent. Nasal mucosa clear. Mouth with normal mucosa, clear oropharynx. Uvula midline. Neck supple.    EYES: Clear bilaterally, pupils equally round and reactive to light.   CARDIOVASCULAR: Normal rate, regular rhythm.  Heart sounds S1, S2.  No murmurs, rubs or gallops. Normal pulses. Capillary refill < 2 sec.   RESPIRATORY: No increased work of breathing. Breath sounds clear and equal bilaterally.  GASTROINTESTINAL: Abdomen soft, non-distended, non-tender. No rebound, no guarding. Normal bowel sounds. No palpable masses.  GENITOURINARY:  No CVA tenderness.  MUSCULOSKELETAL: Spine appears normal, range of motion is not limited, no muscle or joint tenderness. No edema.   NEUROLOGICAL: Alert and oriented, no asymmetry, moving all extremities equally.  SKIN: Warm, dry and intact. No rash or notable lesions.  PSYCHIATRIC: Normal mood and affect.  HEME/LYMPH: No adenopathy or splenomegaly.    Diagnostic Results      ECG: ECGs read and interpreted by me. See ED Course, below, for interpretation.    Labs Reviewed   CBC WITH AUTO DIFFERENTIAL - Abnormal       Result Value    WBC 14.5 (*)     nRBC 0.0      RBC 5.84 (*)     Hemoglobin 11.9 (*)     Hematocrit 40.7      MCV 70 (*)     MCH 20.4 (*)     MCHC 29.2 (*)     RDW 16.2 (*)     Platelets 403      Neutrophils % 84.8      Immature Granulocytes %, Automated 0.2      Lymphocytes % 11.8      Monocytes % 2.7      Eosinophils % 0.3      Basophils % 0.2      Neutrophils Absolute 12.30 (*)     Immature Granulocytes Absolute, Automated 0.03      Lymphocytes Absolute 1.71      Monocytes Absolute 0.39      Eosinophils Absolute 0.05      Basophils Absolute 0.03     COMPREHENSIVE METABOLIC PANEL - Abnormal    Glucose " 103 (*)     Sodium 138      Potassium 4.3      Chloride 105      Bicarbonate 27      Anion Gap 10      Urea Nitrogen 13      Creatinine 0.76      eGFR >90      Calcium 9.5      Albumin 3.7      Alkaline Phosphatase 91      Total Protein 8.4 (*)     AST 18      Bilirubin, Total 0.5      ALT 19     URINALYSIS WITH REFLEX CULTURE AND MICROSCOPIC - Abnormal    Color, Urine Yellow      Appearance, Urine Turbid (*)     Specific Gravity, Urine 1.034      pH, Urine 5.5      Protein, Urine 50 (1+) (*)     Glucose, Urine Normal      Blood, Urine NEGATIVE      Ketones, Urine TRACE (*)     Bilirubin, Urine NEGATIVE      Urobilinogen, Urine 2 (1+) (*)     Nitrite, Urine NEGATIVE      Leukocyte Esterase, Urine 75 Lauren/µL (*)    MICROSCOPIC ONLY, URINE - Abnormal    WBC, Urine 6-10 (*)     RBC, Urine 1-2      Squamous Epithelial Cells, Urine 10-25 (FEW)      Bacteria, Urine 1+ (*)     Mucus, Urine 2+     MAGNESIUM - Normal    Magnesium 1.94     LIPASE - Normal    Lipase 10      Narrative:     Venipuncture immediately after or during the administration of Metamizole may lead to falsely low results. Testing should be performed immediately prior to Metamizole dosing.   LACTATE - Normal    Lactate 1.2      Narrative:     Venipuncture immediately after or during the administration of Metamizole may lead to falsely low results. Testing should be performed immediately prior to Metamizole dosing.   HCG, URINE, QUALITATIVE - Normal    HCG, Urine NEGATIVE     SARS-COV-2 AND INFLUENZA A/B PCR - Normal    Flu A Result Not Detected      Flu B Result Not Detected      Coronavirus 2019, PCR Not Detected      Narrative:     This assay has received FDA Emergency Use Authorization (EUA) and  is only authorized for the duration of time that circumstances exist to justify the authorization of the emergency use of in vitro diagnostic tests for the detection of SARS-CoV-2 virus and/or diagnosis of COVID-19 infection under section 564(b)(1) of the Act,  21 U.S.C. 360bbb-3(b)(1). Testing for SARS-CoV-2 is only recommended for patients who meet current clinical and/or epidemiological criteria as defined by federal, state, or local public health directives. This assay is an in vitro diagnostic nucleic acid amplification test for the qualitative detection of SARS-CoV-2, Influenza A, and Influenza B from nasopharyngeal specimens and has been validated for use at [facility]. Negative results do not preclude COVID-19 infections or Influenza A/B infections, and should not be used as the sole basis for diagnosis, treatment, or other management decisions. If Influenza A/B and RSV PCR results are negative, testing for Parainfluenza virus, Adenovirus and Metapneumovirus is routinely performed for [facility] [place]ology and intensive care inpatients, and is available on other patients by placing an add-on request.    URINE CULTURE   URINALYSIS WITH REFLEX CULTURE AND MICROSCOPIC    Narrative:     The following orders were created for panel order Urinalysis with Reflex Culture and Microscopic.  Procedure                               Abnormality         Status                     ---------                               -----------         ------                     Urinalysis with Reflex C...[[id number]]  Abnormal            Final result               Extra Urine Gray Tube[[id number]]                                                         Please view results for these tests on the individual orders.   EXTRA URINE GRAY TUBE         CT abdomen pelvis w IV contrast   Final Result   No acute abdominal or pelvic process.        Mild bladder wall thickening may relate to under distention.   Correlate with symptomatology and urinalysis to exclude infectious   cystitis.        Additional findings as noted above.        MACRO:   None        Signed by: Eva Szymanski 1/1/2025 9:32 PM   Dictation workstation:   [id number]                    Pierre Coma Scale Score:  15                  Procedure  Procedures    ED Course & MDM   Assessment/Plan:   Surendra Street is a 24 y.o. female smoker with no significant past medical history presents with lower abdominal pain and vomiting.  She denies urinary or vaginal complaints.  She may have a viral syndrome.  She has some diffuse lower abdominal tenderness, which seems a little worse on the right side.  Other possibilities include appendicitis, occult ovarian pathology, UTI.  Recent STI testing was negative, low suspicion PID or STI and patient without pelvic concerns.  She denies chance of pregnancy.  Workup was initiated with labs, urinalysis, and CTAP.  She was given Zofran for her nausea. See below for details of ED course and ultimate disposition.    Medications   ondansetron (Zofran) injection 4 mg (4 mg intravenous Given 1/1/25 1725)   iohexol (OMNIPaque) 350 mg iodine/mL solution 75 mL (75 mL intravenous Given 1/1/25 2023)   cephalexin (Keflex) capsule 500 mg (500 mg oral Given 1/1/25 2215)        ED Course as of 01/02/25 0253 Wed Jan 01, 2025   1855 Labs were reviewed by me and are notable for CBC with mild leukocytosis 14.5, mild anemia 11.9, normal platelets, normal CMP, normal magnesium, lipase, lactate, and viral swabs [CG]   1959 UA with leukocyte esterase but only 6-10 WBCs, will send for culture [CG]   2152 CTAP No acute abdominal or pelvic process. Mild bladder wall thickening may relate to under distention. Correlate with symptomatology and urinalysis to exclude infectious cystitis. Additional findings as noted above. [CG]   2218 Patient was reevaluated.  She is feeling better.  She was prescribed Keflex for her presumed UTI.  She was given Zofran on home-going.  Patient was discharged home with anticipatory guidance and strict return precautions. [CG]      ED Course User Index  [CG] Mary Bernal MD         Diagnoses as of 01/02/25 0253   Urinary tract infection without hematuria, site unspecified        Disposition:   DISCHARGE.  The patient was discharged with both verbal and written anticipatory guidance and strict return precautions. Discharge diagnosis, instructions and plan were discussed and understood. I emphasized the importance of following up with their primary care provider within 24-48 hours and with any referrals in the timeframe recommended. At the time of discharge the patient was comfortable and was in no apparent distress. Patient is aware of diagnostic uncertainty and was notified though testing is negative here, there is a very small chance that pathology may be missed.  The patient understands these risks and the patient/family understood to call 911 or return immediately to the emergency department if the symptoms worsen or if they have any additional concerns.      FOLLOW UP  Primary care provider in 1-2 days.      ED Prescriptions       Medication Sig Dispense Start Date End Date Auth. Provider    cephalexin (Keflex) 500 mg capsule Take 1 capsule (500 mg) by mouth 2 times a day for 5 days. 10 capsule 1/1/2025 1/6/2025 Mary Bernal MD    ondansetron ODT (Zofran-ODT) 4 mg disintegrating tablet Dissolve 1 tablet (4 mg) in the mouth every 8 hours if needed for nausea or vomiting. 15 tablet 1/1/2025 -- MD Mary Abad MD  EM/IM/Peds    This note was dictated by speech recognition. Minor errors in transcription may be present.     Mary Bernal MD  01/02/25 0254

## 2025-01-02 NOTE — DISCHARGE INSTRUCTIONS
You were seen today for lower abdominal pain.  You are found to have a urinary tract infection.  We prescribed you antibiotics for this.   Urinary tract infection your evaluation was not concerning for an emergency at this time. Please see the attached information sheet for information about your condition, how to care for your condition at home, and reasons to return to the emergency department. Take any prescriptions written today as prescribed. You should call your primary care provider within 24 hours to tell them about today's visit, including any new medications or medication changes, as he or she may want to see you in the office for further evaluation. If you do not have a primary care provider, call  [phone] for an appointment. We offer in-person office visits as well as virtual options. Please do not hesitate to call  [phone] or return to the emergency department with any new or unresolved concerns or symptoms. Thank you for choosing [facility] for your care.

## 2025-01-03 ENCOUNTER — DOCUMENTATION (OUTPATIENT)
Dept: EMERGENCY MEDICINE | Facility: HOSPITAL | Age: 25
End: 2025-01-03
Payer: MEDICAID

## 2025-01-03 LAB — BACTERIA UR CULT: NORMAL

## 2025-02-03 ENCOUNTER — PHARMACY VISIT (OUTPATIENT)
Dept: PHARMACY | Facility: CLINIC | Age: 25
End: 2025-02-03
Payer: MEDICAID

## 2025-02-03 ENCOUNTER — APPOINTMENT (OUTPATIENT)
Dept: OPHTHALMOLOGY | Facility: CLINIC | Age: 25
End: 2025-02-03
Payer: MEDICAID

## 2025-02-03 DIAGNOSIS — H52.13 MYOPIC ASTIGMATISM OF BOTH EYES: Primary | ICD-10-CM

## 2025-02-03 DIAGNOSIS — H52.203 MYOPIC ASTIGMATISM OF BOTH EYES: Primary | ICD-10-CM

## 2025-02-03 PROCEDURE — 92015 DETERMINE REFRACTIVE STATE: CPT | Performed by: OPTOMETRIST

## 2025-02-03 PROCEDURE — 92004 COMPRE OPH EXAM NEW PT 1/>: CPT | Performed by: OPTOMETRIST

## 2025-02-03 PROCEDURE — RXMED WILLOW AMBULATORY MEDICATION CHARGE

## 2025-02-03 ASSESSMENT — REFRACTION_WEARINGRX
OD_SPHERE: -1.50
OS_CYLINDER: -0.75
OS_SPHERE: -1.50
OS_AXIS: 165
OD_CYLINDER: -0.75
OD_AXIS: 165

## 2025-02-03 ASSESSMENT — ENCOUNTER SYMPTOMS
MUSCULOSKELETAL NEGATIVE: 0
ENDOCRINE NEGATIVE: 0
GASTROINTESTINAL NEGATIVE: 0
HEMATOLOGIC/LYMPHATIC NEGATIVE: 0
RESPIRATORY NEGATIVE: 0
ALLERGIC/IMMUNOLOGIC NEGATIVE: 0
CONSTITUTIONAL NEGATIVE: 0
PSYCHIATRIC NEGATIVE: 0
CARDIOVASCULAR NEGATIVE: 0
EYES NEGATIVE: 0
NEUROLOGICAL NEGATIVE: 1

## 2025-02-03 ASSESSMENT — REFRACTION_MANIFEST
OS_SPHERE: -1.75
OD_SPHERE: -1.50
OS_AXIS: 180
OD_CYLINDER: -0.75
OD_AXIS: 170
OS_CYLINDER: -1.00

## 2025-02-03 ASSESSMENT — CONF VISUAL FIELD
OS_SUPERIOR_NASAL_RESTRICTION: 0
OD_INFERIOR_NASAL_RESTRICTION: 0
OD_NORMAL: 1
OS_SUPERIOR_TEMPORAL_RESTRICTION: 0
OD_INFERIOR_TEMPORAL_RESTRICTION: 0
OS_INFERIOR_NASAL_RESTRICTION: 0
METHOD: COUNTING FINGERS
OD_SUPERIOR_TEMPORAL_RESTRICTION: 0
OS_INFERIOR_TEMPORAL_RESTRICTION: 0
OD_SUPERIOR_NASAL_RESTRICTION: 0
OS_NORMAL: 1

## 2025-02-03 ASSESSMENT — CUP TO DISC RATIO
OD_RATIO: .4
OS_RATIO: .4

## 2025-02-03 ASSESSMENT — TONOMETRY
OS_IOP_MMHG: 18
OD_IOP_MMHG: 17
IOP_METHOD: GOLDMANN APPLANATION

## 2025-02-03 ASSESSMENT — EXTERNAL EXAM - RIGHT EYE: OD_EXAM: NORMAL

## 2025-02-03 ASSESSMENT — SLIT LAMP EXAM - LIDS
COMMENTS: GOOD POSITION
COMMENTS: GOOD POSITION

## 2025-02-03 ASSESSMENT — EXTERNAL EXAM - LEFT EYE: OS_EXAM: NORMAL

## 2025-02-03 ASSESSMENT — VISUAL ACUITY
OD_SC: 20/100
METHOD: SNELLEN - LINEAR
OS_SC: 20/150

## 2025-02-03 NOTE — PROGRESS NOTES
Hx of headaches. May have asthenopia component.    A spectacle prescription was dispensed to be used as needed.     For contacts can RTC $95 fitting quoted.     Ocular health normal. The patient was asked to return to our clinic in one year or sooner if ocular or vision changes occur.

## 2025-02-10 NOTE — ASSESSMENT & PLAN NOTE
PFPT referral   [Large Joint Injection] : Large joint injection [Bilateral] : bilaterally of the [Knee] : knee [Pain] : pain [X-ray evidence of Osteoarthritis on this or prior visit] : x-ray evidence of Osteoarthritis on this or prior visit [Betadine] : betadine [Ethyl Chloride sprayed topically] : ethyl chloride sprayed topically [Sterile technique used] : sterile technique used [Orthovisc (30mg)] : 30mg of Orthovisc [] : Patient tolerated procedure well [Apply ice for 15min out of every hour for the next 12-24 hours as tolerated] : apply ice for 15 minutes out of every hour for the next 12-24 hours as tolerated [Patient was advised to rest the joint(s) for ____ days] : patient was advised to rest the joint(s) for [unfilled] days [Risks, benefits, alternatives discussed / Verbal consent obtained] : the risks benefits, and alternatives have been discussed, and verbal consent was obtained [Prior failure or difficult injection] : prior failure or difficult injection [All ultrasound images have been permanently captured and stored accordingly in our picture archiving and communication system] : All ultrasound images have been permanently captured and stored accordingly in our picture archiving and communication system [Visualization of the needle and placement of injection was performed without complication] : visualization of the needle and placement of injection was performed without complication [#3] : series #3

## 2026-02-09 ENCOUNTER — APPOINTMENT (OUTPATIENT)
Dept: OPHTHALMOLOGY | Facility: CLINIC | Age: 26
End: 2026-02-09
Payer: MEDICAID